# Patient Record
Sex: FEMALE | Race: ASIAN | NOT HISPANIC OR LATINO | Employment: UNEMPLOYED | URBAN - METROPOLITAN AREA
[De-identification: names, ages, dates, MRNs, and addresses within clinical notes are randomized per-mention and may not be internally consistent; named-entity substitution may affect disease eponyms.]

---

## 2022-10-15 ENCOUNTER — ANESTHESIA EVENT (OUTPATIENT)
Dept: OBSTETRICS AND GYNECOLOGY | Facility: HOSPITAL | Age: 37
End: 2022-10-15
Payer: COMMERCIAL

## 2022-10-15 ENCOUNTER — ANESTHESIA (OUTPATIENT)
Dept: OBSTETRICS AND GYNECOLOGY | Facility: HOSPITAL | Age: 37
End: 2022-10-15
Payer: COMMERCIAL

## 2022-10-15 ENCOUNTER — HOSPITAL ENCOUNTER (INPATIENT)
Facility: HOSPITAL | Age: 37
LOS: 3 days | Discharge: HOME | End: 2022-10-18
Attending: OBSTETRICS & GYNECOLOGY | Admitting: OBSTETRICS & GYNECOLOGY
Payer: COMMERCIAL

## 2022-10-15 PROBLEM — Z98.891 PREVIOUS CESAREAN SECTION: Status: ACTIVE | Noted: 2022-10-15

## 2022-10-15 LAB
BASOPHILS # BLD AUTO: 0.04 X10(3)/MCL (ref 0–0.2)
BASOPHILS NFR BLD AUTO: 0.5 %
EOSINOPHIL # BLD AUTO: 0.1 X10(3)/MCL (ref 0–0.9)
EOSINOPHIL NFR BLD AUTO: 1.2 %
ERYTHROCYTE [DISTWIDTH] IN BLOOD BY AUTOMATED COUNT: 14.7 % (ref 11.5–17)
GROUP & RH: NORMAL
HCT VFR BLD AUTO: 40.6 % (ref 37–47)
HGB BLD-MCNC: 13.4 GM/DL (ref 12–16)
IMM GRANULOCYTES # BLD AUTO: 0.32 X10(3)/MCL (ref 0–0.04)
IMM GRANULOCYTES NFR BLD AUTO: 3.7 %
INDIRECT COOMBS GEL: NORMAL
LYMPHOCYTES # BLD AUTO: 1.85 X10(3)/MCL (ref 0.6–4.6)
LYMPHOCYTES NFR BLD AUTO: 21.3 %
MCH RBC QN AUTO: 31.5 PG (ref 27–31)
MCHC RBC AUTO-ENTMCNC: 33 MG/DL (ref 33–36)
MCV RBC AUTO: 95.3 FL (ref 80–94)
MONOCYTES # BLD AUTO: 0.87 X10(3)/MCL (ref 0.1–1.3)
MONOCYTES NFR BLD AUTO: 10 %
NEUTROPHILS # BLD AUTO: 5.5 X10(3)/MCL (ref 2.1–9.2)
NEUTROPHILS NFR BLD AUTO: 63.3 %
NRBC BLD AUTO-RTO: 0 %
PLATELET # BLD AUTO: 226 X10(3)/MCL (ref 130–400)
PMV BLD AUTO: 10.5 FL (ref 7.4–10.4)
RBC # BLD AUTO: 4.26 X10(6)/MCL (ref 4.2–5.4)
T PALLIDUM AB SER QL: NONREACTIVE
WBC # SPEC AUTO: 8.7 X10(3)/MCL (ref 4.5–11.5)

## 2022-10-15 PROCEDURE — 63600175 PHARM REV CODE 636 W HCPCS: Performed by: OBSTETRICS & GYNECOLOGY

## 2022-10-15 PROCEDURE — 25000003 PHARM REV CODE 250: Performed by: ANESTHESIOLOGY

## 2022-10-15 PROCEDURE — 88305 TISSUE EXAM BY PATHOLOGIST: CPT | Performed by: OBSTETRICS & GYNECOLOGY

## 2022-10-15 PROCEDURE — 62322 NJX INTERLAMINAR LMBR/SAC: CPT | Performed by: REGISTERED NURSE

## 2022-10-15 PROCEDURE — 86780 TREPONEMA PALLIDUM: CPT | Performed by: OBSTETRICS & GYNECOLOGY

## 2022-10-15 PROCEDURE — 37000008 HC ANESTHESIA 1ST 15 MINUTES: Performed by: OBSTETRICS & GYNECOLOGY

## 2022-10-15 PROCEDURE — 86850 RBC ANTIBODY SCREEN: CPT | Performed by: OBSTETRICS & GYNECOLOGY

## 2022-10-15 PROCEDURE — 85025 COMPLETE CBC W/AUTO DIFF WBC: CPT | Performed by: OBSTETRICS & GYNECOLOGY

## 2022-10-15 PROCEDURE — 51702 INSERT TEMP BLADDER CATH: CPT

## 2022-10-15 PROCEDURE — 63600175 PHARM REV CODE 636 W HCPCS: Performed by: REGISTERED NURSE

## 2022-10-15 PROCEDURE — 36415 COLL VENOUS BLD VENIPUNCTURE: CPT | Performed by: OBSTETRICS & GYNECOLOGY

## 2022-10-15 PROCEDURE — 36004724 HC OB OR TIME LEV III - 1ST 15 MIN: Performed by: OBSTETRICS & GYNECOLOGY

## 2022-10-15 PROCEDURE — 71000033 HC RECOVERY, INTIAL HOUR: Performed by: OBSTETRICS & GYNECOLOGY

## 2022-10-15 PROCEDURE — 25000003 PHARM REV CODE 250: Performed by: OBSTETRICS & GYNECOLOGY

## 2022-10-15 PROCEDURE — 37000009 HC ANESTHESIA EA ADD 15 MINS: Performed by: OBSTETRICS & GYNECOLOGY

## 2022-10-15 PROCEDURE — 36004725 HC OB OR TIME LEV III - EA ADD 15 MIN: Performed by: OBSTETRICS & GYNECOLOGY

## 2022-10-15 PROCEDURE — 11000001 HC ACUTE MED/SURG PRIVATE ROOM

## 2022-10-15 PROCEDURE — 27201423 OPTIME MED/SURG SUP & DEVICES STERILE SUPPLY: Performed by: OBSTETRICS & GYNECOLOGY

## 2022-10-15 PROCEDURE — 27000492 HC SLEEVE, SCD T/L

## 2022-10-15 RX ORDER — SODIUM CITRATE AND CITRIC ACID MONOHYDRATE 334; 500 MG/5ML; MG/5ML
30 SOLUTION ORAL ONCE
Status: CANCELLED | OUTPATIENT
Start: 2022-10-15 | End: 2022-10-15

## 2022-10-15 RX ORDER — ADHESIVE BANDAGE
30 BANDAGE TOPICAL 2 TIMES DAILY PRN
Status: DISCONTINUED | OUTPATIENT
Start: 2022-10-16 | End: 2022-10-18 | Stop reason: HOSPADM

## 2022-10-15 RX ORDER — CEFAZOLIN SODIUM 1 G/3ML
INJECTION, POWDER, FOR SOLUTION INTRAMUSCULAR; INTRAVENOUS
Status: DISCONTINUED | OUTPATIENT
Start: 2022-10-15 | End: 2022-10-15

## 2022-10-15 RX ORDER — NALBUPHINE HYDROCHLORIDE 10 MG/ML
2.5 INJECTION, SOLUTION INTRAMUSCULAR; INTRAVENOUS; SUBCUTANEOUS ONCE
Status: CANCELLED | OUTPATIENT
Start: 2022-10-15 | End: 2022-10-15

## 2022-10-15 RX ORDER — MORPHINE SULFATE 1 MG/ML
INJECTION, SOLUTION EPIDURAL; INTRATHECAL; INTRAVENOUS
Status: DISCONTINUED | OUTPATIENT
Start: 2022-10-15 | End: 2022-10-15

## 2022-10-15 RX ORDER — KETOROLAC TROMETHAMINE 30 MG/ML
30 INJECTION, SOLUTION INTRAMUSCULAR; INTRAVENOUS EVERY 8 HOURS
Status: COMPLETED | OUTPATIENT
Start: 2022-10-15 | End: 2022-10-15

## 2022-10-15 RX ORDER — PRENATAL WITH FERROUS FUM AND FOLIC ACID 3080; 920; 120; 400; 22; 1.84; 3; 20; 10; 1; 12; 200; 27; 25; 2 [IU]/1; [IU]/1; MG/1; [IU]/1; MG/1; MG/1; MG/1; MG/1; MG/1; MG/1; UG/1; MG/1; MG/1; MG/1; MG/1
1 TABLET ORAL DAILY
Status: DISCONTINUED | OUTPATIENT
Start: 2022-10-15 | End: 2022-10-18 | Stop reason: HOSPADM

## 2022-10-15 RX ORDER — AMOXICILLIN 250 MG
1 CAPSULE ORAL NIGHTLY PRN
Status: DISCONTINUED | OUTPATIENT
Start: 2022-10-15 | End: 2022-10-18 | Stop reason: HOSPADM

## 2022-10-15 RX ORDER — SODIUM CHLORIDE, SODIUM LACTATE, POTASSIUM CHLORIDE, CALCIUM CHLORIDE 600; 310; 30; 20 MG/100ML; MG/100ML; MG/100ML; MG/100ML
INJECTION, SOLUTION INTRAVENOUS CONTINUOUS
Status: DISCONTINUED | OUTPATIENT
Start: 2022-10-15 | End: 2022-10-15

## 2022-10-15 RX ORDER — PROCHLORPERAZINE EDISYLATE 5 MG/ML
5 INJECTION INTRAMUSCULAR; INTRAVENOUS EVERY 6 HOURS PRN
Status: DISCONTINUED | OUTPATIENT
Start: 2022-10-15 | End: 2022-10-18 | Stop reason: HOSPADM

## 2022-10-15 RX ORDER — EPHEDRINE SULFATE 50 MG/ML
10 INJECTION, SOLUTION INTRAVENOUS ONCE AS NEEDED
Status: CANCELLED | OUTPATIENT
Start: 2022-10-15 | End: 2034-03-12

## 2022-10-15 RX ORDER — OXYCODONE AND ACETAMINOPHEN 5; 325 MG/1; MG/1
1 TABLET ORAL EVERY 4 HOURS PRN
Status: DISCONTINUED | OUTPATIENT
Start: 2022-10-15 | End: 2022-10-18 | Stop reason: HOSPADM

## 2022-10-15 RX ORDER — BUPIVACAINE HYDROCHLORIDE 7.5 MG/ML
INJECTION, SOLUTION EPIDURAL; RETROBULBAR
Status: COMPLETED | OUTPATIENT
Start: 2022-10-15 | End: 2022-10-15

## 2022-10-15 RX ORDER — DOCUSATE SODIUM 100 MG/1
200 CAPSULE, LIQUID FILLED ORAL 2 TIMES DAILY
Status: DISCONTINUED | OUTPATIENT
Start: 2022-10-15 | End: 2022-10-18 | Stop reason: HOSPADM

## 2022-10-15 RX ORDER — PHENYLEPHRINE HYDROCHLORIDE 10 MG/ML
INJECTION INTRAVENOUS
Status: DISCONTINUED | OUTPATIENT
Start: 2022-10-15 | End: 2022-10-15

## 2022-10-15 RX ORDER — SODIUM CHLORIDE 0.9 % (FLUSH) 0.9 %
10 SYRINGE (ML) INJECTION
Status: DISCONTINUED | OUTPATIENT
Start: 2022-10-15 | End: 2022-10-18 | Stop reason: HOSPADM

## 2022-10-15 RX ORDER — OXYCODONE AND ACETAMINOPHEN 10; 325 MG/1; MG/1
1 TABLET ORAL EVERY 4 HOURS PRN
Status: DISCONTINUED | OUTPATIENT
Start: 2022-10-15 | End: 2022-10-18 | Stop reason: HOSPADM

## 2022-10-15 RX ORDER — CEFAZOLIN SODIUM 2 G/50ML
2 SOLUTION INTRAVENOUS ONCE
Status: DISCONTINUED | OUTPATIENT
Start: 2022-10-15 | End: 2022-10-15

## 2022-10-15 RX ORDER — DIPHENHYDRAMINE HCL 25 MG
25 CAPSULE ORAL EVERY 4 HOURS PRN
Status: DISCONTINUED | OUTPATIENT
Start: 2022-10-15 | End: 2022-10-18 | Stop reason: HOSPADM

## 2022-10-15 RX ORDER — DIPHENHYDRAMINE HYDROCHLORIDE 50 MG/ML
12.5 INJECTION INTRAMUSCULAR; INTRAVENOUS EVERY 4 HOURS PRN
Status: CANCELLED | OUTPATIENT
Start: 2022-10-15

## 2022-10-15 RX ORDER — ONDANSETRON 4 MG/1
8 TABLET, ORALLY DISINTEGRATING ORAL EVERY 8 HOURS PRN
Status: DISCONTINUED | OUTPATIENT
Start: 2022-10-15 | End: 2022-10-18 | Stop reason: HOSPADM

## 2022-10-15 RX ORDER — ACETAMINOPHEN 10 MG/ML
INJECTION, SOLUTION INTRAVENOUS
Status: DISCONTINUED | OUTPATIENT
Start: 2022-10-15 | End: 2022-10-15

## 2022-10-15 RX ORDER — SIMETHICONE 80 MG
1 TABLET,CHEWABLE ORAL EVERY 6 HOURS PRN
Status: DISCONTINUED | OUTPATIENT
Start: 2022-10-15 | End: 2022-10-18 | Stop reason: HOSPADM

## 2022-10-15 RX ORDER — ONDANSETRON HYDROCHLORIDE 2 MG/ML
INJECTION, SOLUTION INTRAMUSCULAR; INTRAVENOUS
Status: DISCONTINUED | OUTPATIENT
Start: 2022-10-15 | End: 2022-10-15

## 2022-10-15 RX ORDER — OXYTOCIN/RINGER'S LACTATE 30/500 ML
95 PLASTIC BAG, INJECTION (ML) INTRAVENOUS ONCE
Status: COMPLETED | OUTPATIENT
Start: 2022-10-15 | End: 2022-10-15

## 2022-10-15 RX ORDER — BISACODYL 10 MG
10 SUPPOSITORY, RECTAL RECTAL ONCE AS NEEDED
Status: DISCONTINUED | OUTPATIENT
Start: 2022-10-15 | End: 2022-10-18 | Stop reason: HOSPADM

## 2022-10-15 RX ORDER — OXYTOCIN/RINGER'S LACTATE 30/500 ML
PLASTIC BAG, INJECTION (ML) INTRAVENOUS CONTINUOUS PRN
Status: DISCONTINUED | OUTPATIENT
Start: 2022-10-15 | End: 2022-10-15

## 2022-10-15 RX ORDER — FENTANYL CITRATE 50 UG/ML
INJECTION, SOLUTION INTRAMUSCULAR; INTRAVENOUS
Status: DISCONTINUED | OUTPATIENT
Start: 2022-10-15 | End: 2022-10-15

## 2022-10-15 RX ORDER — HYDROMORPHONE HYDROCHLORIDE 2 MG/ML
1 INJECTION, SOLUTION INTRAMUSCULAR; INTRAVENOUS; SUBCUTANEOUS EVERY 4 HOURS PRN
Status: DISCONTINUED | OUTPATIENT
Start: 2022-10-15 | End: 2022-10-18 | Stop reason: HOSPADM

## 2022-10-15 RX ORDER — SODIUM CITRATE AND CITRIC ACID MONOHYDRATE 334; 500 MG/5ML; MG/5ML
SOLUTION ORAL
Status: DISPENSED
Start: 2022-10-15 | End: 2022-10-15

## 2022-10-15 RX ADMIN — BUPIVACAINE HYDROCHLORIDE 1.6 ML: 7.5 INJECTION, SOLUTION EPIDURAL; RETROBULBAR at 04:10

## 2022-10-15 RX ADMIN — FENTANYL CITRATE 10 MCG: 50 INJECTION, SOLUTION INTRAMUSCULAR; INTRAVENOUS at 04:10

## 2022-10-15 RX ADMIN — KETOROLAC TROMETHAMINE 30 MG: 30 INJECTION, SOLUTION INTRAMUSCULAR at 08:10

## 2022-10-15 RX ADMIN — SODIUM CHLORIDE, POTASSIUM CHLORIDE, SODIUM LACTATE AND CALCIUM CHLORIDE: 600; 310; 30; 20 INJECTION, SOLUTION INTRAVENOUS at 04:10

## 2022-10-15 RX ADMIN — KETOROLAC TROMETHAMINE 30 MG: 30 INJECTION, SOLUTION INTRAMUSCULAR at 01:10

## 2022-10-15 RX ADMIN — MORPHINE SULFATE 0.15 MG: 1 INJECTION, SOLUTION EPIDURAL; INTRATHECAL; INTRAVENOUS at 04:10

## 2022-10-15 RX ADMIN — ACETAMINOPHEN 1000 MG: 10 INJECTION, SOLUTION INTRAVENOUS at 04:10

## 2022-10-15 RX ADMIN — Medication 1000 ML/HR: at 04:10

## 2022-10-15 RX ADMIN — Medication 95 MILLI-UNITS/MIN: at 08:10

## 2022-10-15 RX ADMIN — ONDANSETRON 4 MG: 2 INJECTION INTRAMUSCULAR; INTRAVENOUS at 04:10

## 2022-10-15 RX ADMIN — OXYCODONE AND ACETAMINOPHEN 1 TABLET: 325; 10 TABLET ORAL at 05:10

## 2022-10-15 RX ADMIN — PHENYLEPHRINE HYDROCHLORIDE 200 MCG: 10 INJECTION INTRAVENOUS at 04:10

## 2022-10-15 RX ADMIN — CEFAZOLIN 2 G: 330 INJECTION, POWDER, FOR SOLUTION INTRAMUSCULAR; INTRAVENOUS at 04:10

## 2022-10-15 RX ADMIN — DIPHENHYDRAMINE HYDROCHLORIDE 25 MG: 25 CAPSULE ORAL at 09:10

## 2022-10-15 RX ADMIN — DOCUSATE SODIUM 200 MG: 100 CAPSULE, LIQUID FILLED ORAL at 09:10

## 2022-10-15 RX ADMIN — KETOROLAC TROMETHAMINE 30 MG: 30 INJECTION, SOLUTION INTRAMUSCULAR at 09:10

## 2022-10-15 NOTE — TRANSFER OF CARE
Anesthesia Transfer of Care Note    Patient: Mack Lopez    Procedure(s) Performed: Procedure(s) (LRB):   SECTION (N/A)    Patient location: Labor and Delivery    Anesthesia Type: spinal    Transport from OR: Transported from OR on room air with adequate spontaneous ventilation    Post pain: adequate analgesia    Post assessment: no apparent anesthetic complications and tolerated procedure well    Post vital signs: stable    Level of consciousness: awake, alert and oriented    Nausea/Vomiting: no nausea/vomiting    Complications: none    Transfer of care protocol was followed      Last vitals:   Visit Vitals  /82 (BP Location: Left arm, Patient Position: Lying)   Pulse 80   Breastfeeding No

## 2022-10-15 NOTE — ANESTHESIA PROCEDURE NOTES
Spinal    Diagnosis:  section  Patient location during procedure: OB  Start time: 10/15/2022 4:10 AM  Timeout: 10/15/2022 4:10 AM  End time: 10/15/2022 4:20 AM    Staffing  Authorizing Provider: Xavi Peters MD  Performing Provider: Gibran Huddleston CRNA    Preanesthetic Checklist  Completed: patient identified, IV checked, site marked, risks and benefits discussed, surgical consent, monitors and equipment checked, pre-op evaluation and timeout performed  Spinal Block  Patient position: sitting  Prep: ChloraPrep  Patient monitoring: heart rate, cardiac monitor, continuous pulse ox and frequent blood pressure checks  Approach: midline  Location: L3-4  Injection technique: single shot  CSF Fluid: clear free-flowing CSF  Needle  Needle type: pencil-tip   Needle gauge: 25 G  Needle length: 5 in  Additional Documentation: negative aspiration for heme and incremental injection  Needle localization: anatomical landmarks  Assessment  Sensory level: T4   Dermatomal levels determined by alcohol wipe  Ease of block: easy  Patient's tolerance of the procedure: comfortable throughout block and no complaints  Medications:    Medications: bupivacaine (pf) (MARCAINE) injection 0.75% - Intraspinal   1.6 mL - 10/15/2022 4:20:00 AM         Dermal Autograft Text: The defect edges were debeveled with a #15 scalpel blade.  Given the location of the defect, shape of the defect and the proximity to free margins a dermal autograft was deemed most appropriate.  Using a sterile surgical marker, the primary defect shape was transferred to the donor site. The area thus outlined was incised deep to adipose tissue with a #15 scalpel blade.  The harvested graft was then trimmed of adipose and epidermal tissue until only dermis was left.  The skin graft was then placed in the primary defect and oriented appropriately.

## 2022-10-15 NOTE — ANESTHESIA PREPROCEDURE EVALUATION
10/15/2022  Mack Lopez is a 37 y.o., female.      Pre-op Assessment    I have reviewed the Patient Summary Reports.     I have reviewed the Nursing Notes. I have reviewed the NPO Status.   I have reviewed the Medications.     Review of Systems  Anesthesia Hx:  No problems with previous Anesthesia    Hematology/Oncology:  Hematology Normal   Oncology Normal     EENT/Dental:EENT/Dental Normal   Cardiovascular:  Cardiovascular Normal     Pulmonary:  Pulmonary Normal    Renal/:  Renal/ Normal     Hepatic/GI:  Hepatic/GI Normal    Musculoskeletal:  Musculoskeletal Normal    Neurological:  Neurology Normal    Endocrine:  Endocrine Normal    Dermatological:  Skin Normal    Psych:  Psychiatric Normal           Physical Exam  General: Well nourished, Cooperative, Alert, Oriented and Anxious    Airway:  Mallampati: II   Mouth Opening: Normal  TM Distance: Normal  Tongue: Normal  Neck ROM: Normal ROM    Dental:  Intact    Chest/Lungs:  Clear to auscultation, Normal Respiratory Rate    Heart:  Rate: Normal  Rhythm: Regular Rhythm        Anesthesia Plan  Type of Anesthesia, risks & benefits discussed:    Anesthesia Type: Spinal  Intra-op Monitoring Plan: Standard ASA Monitors  Post Op Pain Control Plan: multimodal analgesia  Informed Consent: Informed consent signed with the Patient and all parties understand the risks and agree with anesthesia plan.  All questions answered.   ASA Score: 1  Day of Surgery Review of History & Physical: H&P Update referred to the surgeon/provider.    Ready For Surgery From Anesthesia Perspective.     .

## 2022-10-15 NOTE — L&D DELIVERY NOTE
Pre-op Diagnosis:   1.  Intrauterine Pregnancy at 38 WGA  2.  Previous c/s x 1  3. labor  4. Declines TOLAC  5. Undesired fertility    Postop Diagnosis: Same  Procedure: Repeat Low Transverse  Section via Pfannenstiel incision with bilateral tubal ligation  Surgeon: Brittany Alonso MD  Anesthesia: Spinal  Complications: None  QBL: per RN  Findings: Normal uterus, tubes and ovaries.  Viable male infant with Apgars of 8,9 weighing 7lbs  Specimen: Placenta, left and right tube segments    Indication and Consent: Patient presented to L&D scheduled repeat  delivery. I discussed risks, benefits and options with her in detail, including trial of labor.  She desired to proceed with a repeat  delivery. The patient understood that the risks of  section include, but are not limited to, visceral or vascular injury, infection, blood loss and the need for transfusion, prolonged hospitalization and reoperation.  The patient stated understanding and desired to proceed.  All questions were answered.     Procedure: She was taken to the operating room where epidural anesthesia was found to be adequate.  Ancef was given for infection prophylaxis.  She was prepped and draped in the dorsal supine position with a leftward tilt.  A Pfannenstiel skin incision was made with the scalpel and carried down to the fascia with the Bovie.  The fascia was incised and extended laterally with the Bovie.  The superior aspect of the fascia was grasped with the Kocher clamps.  The underlying rectus muscle was dissected off sharply with Green scissors.  In a similar fashion, the inferior aspect of the fascia was elevated with the Kocher clamps and the rectus and pyramidalis muscles were dissected off.  Hemostasis was achieved with the Bovie.  The rectus muscle was  in the midline down to the level of the pubic symphysis.  Preperitoneal fatty tissue was bluntly dissected to expose the peritoneum.  The peritoneum was  found to be free of adherent bowel and entered sharply with scissors.  The peritoneal incision was extended superiorly and inferiorly to the bladder reflection with good visualization of the bladder.  The Az retractor was placed. The vesicouterine peritoneum identified, then opened with scissors and the bladder flap was developed.   The lower uterine segment was incised with a scalpel.  The amniotic sac was ruptured and clear was noted.  The uterine incision was extended bluntly with lateral and upward traction.  The fetus was in cephalic presentation.  The head was gently elevated out of the pelvis and gentle fundal pressure was applied once the head was brought to the incision.  The infant was delivered without difficulty.  The mouth and nose were suctioned with bulb suction.  The cord was clamped and cut.  The infant was handed off to waiting NICU and respiratory personnel.  IV Pitocin was initiated to facilitate uterine contractions.  The placenta was delivered intact with manual massage of the uterine fundus.  The inside of the uterus was gently wiped with a lap sponge to assure complete removal of placental membranes.  The uterine incision was closed with a 0 Vicryl suture in a running locked fashion.  Blood clots and fluid were wiped out of the abdomen and pelvis with moist lap sponges.  The uterine incision was reinspected and good hemostasis was noted. Left tube grasped with tyson clamp, window made in mesosalpinx and segment excised.  Either pedicle was double ligated with 2-0 plain gut suture.  Same steps repeated on opposite side.  Pedicles reinspected and noted to be hemostatic.The Az retractor was removed.   The peritoneum was closed in a running fashion. The rectus muscles were loosely reapproximated.  The fascia was closed with 1-0 Vicryl in a continuous running fashion.  The subcuticular tissue was irrigated with warm normal saline.  Subcuticular tissue was reapproximated with plain gut.    Skin was closed using Monocryl in a subcuticular fashion.  The patient tolerated the procedure well.  All sponge and lap counts were correct times 2.  The patient was taken to the recovery room in stable condition.

## 2022-10-15 NOTE — PLAN OF CARE
Problem: Adult Inpatient Plan of Care  Goal: Plan of Care Review  Outcome: Ongoing, Progressing  Flowsheets (Taken 10/15/2022 0614)  Plan of Care Reviewed With:   patient   spouse  Goal: Patient-Specific Goal (Individualized)  Outcome: Ongoing, Progressing  Goal: Absence of Hospital-Acquired Illness or Injury  Outcome: Ongoing, Progressing  Intervention: Identify and Manage Fall Risk  Flowsheets (Taken 10/15/2022 0614)  Safety Promotion/Fall Prevention:   assistive device/personal item within reach   side rails raised x 2   pulse ox   lighting adjusted   instructed to call staff for mobility  Intervention: Prevent and Manage VTE (Venous Thromboembolism) Risk  Flowsheets (Taken 10/15/2022 0614)  VTE Prevention/Management:   remove, assess skin, and reapply sequential compression device   intravenous hydration   ROM (active) performed  Goal: Optimal Comfort and Wellbeing  Outcome: Ongoing, Progressing  Intervention: Monitor Pain and Promote Comfort  Flowsheets (Taken 10/15/2022 0614)  Pain Management Interventions:   warm blanket provided   quiet environment facilitated  Goal: Readiness for Transition of Care  Outcome: Ongoing, Progressing  Intervention: Mutually Develop Transition Plan  Flowsheets (Taken 10/15/2022 0614)  Equipment Currently Used at Home: none  Transportation Anticipated: family or friend will provide  Do you expect to return to your current living situation?: Yes  Do you have help at home or someone to help you manage your care at home?: Yes     Problem:  Fall Injury Risk  Goal: Absence of Fall, Infant Drop and Related Injury  Outcome: Ongoing, Progressing  Intervention: Identify and Manage Contributors  Flowsheets (Taken 10/15/2022 0614)  Self-Care Promotion: independence encouraged  Intervention: Prevent [place] Drop or Fall  Flowsheets (Taken 10/15/2022 0614)  Infant Drop Prevention:   safety rounds completed   room lighting adjusted   support person presence encouraged  Intervention:  Promote Injury-Free Environment  Flowsheets (Taken 10/15/2022 0614)  Safety Promotion/Fall Prevention:   assistive device/personal item within reach   side rails raised x 2   pulse ox   lighting adjusted   instructed to call staff for mobility     Problem: Infection  Goal: Absence of Infection Signs and Symptoms  Outcome: Ongoing, Progressing  Intervention: Prevent or Manage Infection  Flowsheets (Taken 10/15/2022 0614)  Infection Management: aseptic technique maintained

## 2022-10-15 NOTE — ANESTHESIA POSTPROCEDURE EVALUATION
Anesthesia Post Evaluation    Patient: Mack Lopez    Procedure(s) Performed: Procedure(s) (LRB):   SECTION (N/A)    Final Anesthesia Type: spinal      Patient location during evaluation: labor & delivery  Patient participation: Yes- Able to Participate  Level of consciousness: awake and alert  Post-procedure vital signs: reviewed and stable  Pain management: adequate  Airway patency: patent  SUSAN mitigation strategies: Multimodal analgesia  PONV status at discharge: No PONV  Anesthetic complications: no      Cardiovascular status: blood pressure returned to baseline and hemodynamically stable  Respiratory status: unassisted  Hydration status: euvolemic  Follow-up not needed.          Vitals Value Taken Time   /82 10/15/22 0325   Temp  10/15/22 0523   Pulse 80 10/15/22 0325   Resp  10/15/22 0523   SpO2  10/15/22 0523         No case tracking events are documented in the log.      Pain/Lori Score: No data recorded

## 2022-10-15 NOTE — H&P
HISTORY AND PHYSICAL                                                OBSTETRICS          Subjective:      Mack Lopez is a 37 y.o.  female with IUP at 38w1d weeks gestation who presents to L&D with leaking fluid. Pertinent medical history for this pregnancy includes previous c/s x1 and undesired fertility.  Care this pregnancy has been with Dr. Clifton    PMHx: No past medical history on file.    PSHx: History reviewed. No pertinent surgical history.    All: Review of patient's allergies indicates:  No Known Allergies    Meds:   Medications Prior to Admission   Medication Sig Dispense Refill Last Dose    prenatal vit/iron fum/folic ac (PRENATAL 1+1 ORAL) Take by mouth.          SH:   Social History     Socioeconomic History    Marital status: Single   Tobacco Use    Smoking status: Never    Smokeless tobacco: Never   Substance and Sexual Activity    Alcohol use: Not Currently    Drug use: Never    Sexual activity: Yes       FH:   Family History   Problem Relation Age of Onset    Diabetes Father        OBHx:   OB History    Para Term  AB Living   3 1 1 0 1 0   SAB IAB Ectopic Multiple Live Births   0 0 0 0 0      # Outcome Date GA Lbr Gage/2nd Weight Sex Delivery Anes PTL Lv   3 Current            2 Term 17 39w0d    CS-LTranv      1 AB 2016 6w0d       FD       Objective:      /82 (BP Location: Left arm, Patient Position: Lying)   Pulse 80   Breastfeeding No   There is no height or weight on file to calculate BMI.    General:   alert and cooperative   HEENT:  normocephalic, atraumatic   Lungs:   clear to auscultation bilaterally   Heart:   regular rate and rhythm, S1, S2 normal   Abdomen:  gravid, non-tender   Extremities non-tender, no edema   Derm: no rashes or lesions   Psych: appropriate mood and affect   FHT: Reassuring per nursing staff       Assessment:     37 y.o.  at 38w1d weeks gestation here for repeat  delivery  2. H/o  delivery x 1  3. PROM  4.  Undesired fertility     Plan:        1. Risks, benefits, alternatives and possible complications have been discussed in detail with the patient. All questions have been answered, and Ms. Lopez has voiced understanding and agrees to the treatment plan.  2. Consents signed and in chart  3. Admit to Labor and Delivery unit for repeat  delivery  4. Antibiotics per protocol and SCDs for prophylaxis  5. To OR for RLTCS/BTL - r/b/a of permanent sterilization reviewed and patient desires to proceed.

## 2022-10-16 PROCEDURE — 25000003 PHARM REV CODE 250: Performed by: OBSTETRICS & GYNECOLOGY

## 2022-10-16 PROCEDURE — 11000001 HC ACUTE MED/SURG PRIVATE ROOM

## 2022-10-16 RX ADMIN — DOCUSATE SODIUM 200 MG: 100 CAPSULE, LIQUID FILLED ORAL at 09:10

## 2022-10-16 RX ADMIN — IBUPROFEN 800 MG: 200 TABLET, FILM COATED ORAL at 05:10

## 2022-10-16 RX ADMIN — OXYCODONE HYDROCHLORIDE AND ACETAMINOPHEN 1 TABLET: 5; 325 TABLET ORAL at 03:10

## 2022-10-16 RX ADMIN — OXYCODONE AND ACETAMINOPHEN 1 TABLET: 325; 10 TABLET ORAL at 03:10

## 2022-10-16 RX ADMIN — OXYCODONE AND ACETAMINOPHEN 1 TABLET: 325; 10 TABLET ORAL at 09:10

## 2022-10-16 RX ADMIN — IBUPROFEN 800 MG: 200 TABLET, FILM COATED ORAL at 02:10

## 2022-10-16 RX ADMIN — OXYCODONE AND ACETAMINOPHEN 1 TABLET: 325; 10 TABLET ORAL at 08:10

## 2022-10-16 RX ADMIN — PRENATAL VITAMINS-IRON FUMARATE 27 MG IRON-FOLIC ACID 0.8 MG TABLET 1 TABLET: at 09:10

## 2022-10-16 NOTE — PLAN OF CARE
"" I want to get some sleep and rest"  Problem: Adult Inpatient Plan of Care  Goal: Plan of Care Review  Outcome: Ongoing, Progressing  Goal: Patient-Specific Goal (Individualized)  Outcome: Ongoing, Progressing  Goal: Absence of Hospital-Acquired Illness or Injury  Outcome: Ongoing, Progressing  Goal: Optimal Comfort and Wellbeing  Outcome: Ongoing, Progressing  Goal: Readiness for Transition of Care  Outcome: Ongoing, Progressing     "

## 2022-10-16 NOTE — PROGRESS NOTES
PostPartum Progress Note        Subjective:      Post-Operative Day #1 after  delivery secondary to previous c/s x 1 in labor, with BTL .    Patient is without complaints. Lochia decreasing. Breast feeding. Pain is well controlled. Patient is ambulating. Tolerating Full Regular diet.Overall mother and baby are doing well.     Objective:      Temp:  [97.9 °F (36.6 °C)-98.7 °F (37.1 °C)] 98.7 °F (37.1 °C)  Pulse:  [74-93] 93  Resp:  [16-18] 16  BP: ()/(52-73) 103/64    Intake/Output Summary (Last 24 hours) at 10/16/2022 0915  Last data filed at 10/15/2022 1500  Gross per 24 hour   Intake --   Output 1200 ml   Net -1200 ml     Body mass index is 28.9 kg/m².    General: no acute distress  Abdomen: soft, non-tender, non-distended; Fundus firm and below the umbilicus  Incision- intact, healing well, no sign of infection  Extremities: non-tender, symmetric    Group & Rh   Date Value Ref Range Status   10/15/2022 B POS  Final     Recent Results (from the past 336 hour(s))   CBC with Differential    Collection Time: 10/15/22  3:39 AM   Result Value Ref Range    WBC 8.7 4.5 - 11.5 x10(3)/mcL    Hgb 13.4 12.0 - 16.0 gm/dL    Hct 40.6 37.0 - 47.0 %    Platelet 226 130 - 400 x10(3)/mcL          Assessment:     37 y.o.  S/P  Delivery Post-Operative Day #1  - Doing Well      Plan:     1. Continue routine postpartum care  2. Plan for D/C in AM

## 2022-10-17 ENCOUNTER — ANESTHESIA EVENT (OUTPATIENT)
Dept: OBSTETRICS AND GYNECOLOGY | Facility: HOSPITAL | Age: 37
End: 2022-10-17

## 2022-10-17 ENCOUNTER — ANESTHESIA (OUTPATIENT)
Dept: OBSTETRICS AND GYNECOLOGY | Facility: HOSPITAL | Age: 37
End: 2022-10-17

## 2022-10-17 PROCEDURE — 25000003 PHARM REV CODE 250: Performed by: OBSTETRICS & GYNECOLOGY

## 2022-10-17 PROCEDURE — 11000001 HC ACUTE MED/SURG PRIVATE ROOM

## 2022-10-17 RX ADMIN — OXYCODONE AND ACETAMINOPHEN 1 TABLET: 325; 10 TABLET ORAL at 02:10

## 2022-10-17 RX ADMIN — DOCUSATE SODIUM 200 MG: 100 CAPSULE, LIQUID FILLED ORAL at 07:10

## 2022-10-17 RX ADMIN — PRENATAL VITAMINS-IRON FUMARATE 27 MG IRON-FOLIC ACID 0.8 MG TABLET 1 TABLET: at 07:10

## 2022-10-17 RX ADMIN — OXYCODONE AND ACETAMINOPHEN 1 TABLET: 325; 10 TABLET ORAL at 08:10

## 2022-10-17 RX ADMIN — OXYCODONE AND ACETAMINOPHEN 1 TABLET: 325; 10 TABLET ORAL at 07:10

## 2022-10-17 RX ADMIN — IBUPROFEN 800 MG: 200 TABLET, FILM COATED ORAL at 09:10

## 2022-10-17 RX ADMIN — IBUPROFEN 800 MG: 200 TABLET, FILM COATED ORAL at 05:10

## 2022-10-17 RX ADMIN — IBUPROFEN 800 MG: 200 TABLET, FILM COATED ORAL at 12:10

## 2022-10-17 RX ADMIN — DOCUSATE SODIUM 200 MG: 100 CAPSULE, LIQUID FILLED ORAL at 08:10

## 2022-10-17 NOTE — PLAN OF CARE
"  Problem: Adult Inpatient Plan of Care  Goal: Plan of Care Review  Outcome: Ongoing, Progressing  Goal: Patient-Specific Goal (Individualized)  Outcome: Ongoing, Progressing  Flowsheets (Taken 10/16/2022 1926)  Patient-Specific Goals (Include Timeframe): " I want to breastfeed"  Goal: Absence of Hospital-Acquired Illness or Injury  Outcome: Ongoing, Progressing  Goal: Optimal Comfort and Wellbeing  Outcome: Ongoing, Progressing  Goal: Readiness for Transition of Care  Outcome: Ongoing, Progressing     Problem:  Fall Injury Risk  Goal: Absence of Fall, Infant Drop and Related Injury  Outcome: Ongoing, Progressing     Problem: Infection  Goal: Absence of Infection Signs and Symptoms  Outcome: Ongoing, Progressing     "

## 2022-10-17 NOTE — PROGRESS NOTES
Delivery Progress Note  Obstetrics      SUBJECTIVE:     Postpartum Day 2:  Delivery    Ms. Lopez states she feels well. Her pain is well controlled with current medications. The baby is feeding via breast. The patient is ambulating well. Ms. Lopez is tolerating a normal diet. Flatus has been passed. Urinary output is adequate.    OBJECTIVE:     Vital Signs (Most Recent):  Temp: 98 °F (36.7 °C) (10/17/22 0023)  Pulse: 76 (10/17/22 0023)  Resp: 18 (10/17/22 0745)  BP: (!) 108/59 (10/17/22 0023)  SpO2: 100 % (10/15/22 0620)    Vital Signs Range (Last 24H):  Temp:  [98 °F (36.7 °C)-98.8 °F (37.1 °C)]   Pulse:  [76-93]   Resp:  [12-18]   BP: (103-110)/(59-69)     I & O (Last 24H):No intake or output data in the 24 hours ending 10/17/22 0834  Physical Exam:  General:    No distress, alert and oriented   Breasts:  No masses and nontender   Abdomen:  Soft, normal bowel sounds, appropriately tender   Fundus:  Firm, below umbilicus   Incision:  Intact, no erythema or drainage   Lochia:   Rubra, minimal   Lower ext:  No calf tenderness     Hemoglobin/Hematocrit  Recent Labs   Lab 10/15/22  0339   HGB 13.4   HCT 40.6         ASSESSMENT/PLAN:     Status post  section POD#2    Continue routine postpartum care  Encourage ambulation  DC in AM

## 2022-10-17 NOTE — PLAN OF CARE
Problem: Breastfeeding  Goal: Effective Breastfeeding  Outcome: Ongoing, Progressing  Intervention: Promote Breast Care and Comfort  Flowsheets (Taken 10/17/2022 1345)  Breast Care: Breastfeeding:   lanolin to nipples   Hydrogel dressing applied  Intervention: Promote Effective Breastfeeding  Flowsheets (Taken 10/17/2022 1345)  Breastfeeding Assistance:   feeding cue recognition promoted   feeding on demand promoted   support offered   infant latch-on verified  Parent/Child Attachment Promotion:   cue recognition promoted   skin-to-skin contact encouraged  Intervention: Support Exclusive Breastfeeding Success  Flowsheets (Taken 10/17/2022 1345)  Breastfeeding Support: lactation counseling provided

## 2022-10-18 VITALS
SYSTOLIC BLOOD PRESSURE: 108 MMHG | HEIGHT: 60 IN | WEIGHT: 148 LBS | DIASTOLIC BLOOD PRESSURE: 70 MMHG | RESPIRATION RATE: 18 BRPM | TEMPERATURE: 99 F | BODY MASS INDEX: 29.06 KG/M2 | OXYGEN SATURATION: 100 % | HEART RATE: 78 BPM

## 2022-10-18 PROCEDURE — 25000003 PHARM REV CODE 250: Performed by: OBSTETRICS & GYNECOLOGY

## 2022-10-18 RX ORDER — IBUPROFEN 800 MG/1
800 TABLET ORAL EVERY 8 HOURS
Qty: 30 TABLET | Refills: 0 | Status: SHIPPED | OUTPATIENT
Start: 2022-10-18

## 2022-10-18 RX ORDER — OXYCODONE AND ACETAMINOPHEN 10; 325 MG/1; MG/1
1 TABLET ORAL EVERY 4 HOURS PRN
Qty: 20 TABLET | Refills: 0 | Status: SHIPPED | OUTPATIENT
Start: 2022-10-18

## 2022-10-18 RX ADMIN — IBUPROFEN 800 MG: 200 TABLET, FILM COATED ORAL at 02:10

## 2022-10-18 RX ADMIN — OXYCODONE AND ACETAMINOPHEN 1 TABLET: 325; 10 TABLET ORAL at 10:10

## 2022-10-18 RX ADMIN — PRENATAL VITAMINS-IRON FUMARATE 27 MG IRON-FOLIC ACID 0.8 MG TABLET 1 TABLET: at 09:10

## 2022-10-18 RX ADMIN — DOCUSATE SODIUM 200 MG: 100 CAPSULE, LIQUID FILLED ORAL at 09:10

## 2022-10-18 RX ADMIN — IBUPROFEN 800 MG: 200 TABLET, FILM COATED ORAL at 09:10

## 2022-10-18 NOTE — DISCHARGE SUMMARY
Delivery Discharge Summary  Obstetrics      Primary OB Clinician: Sharron Clifton MD    Discharge Provider: Isabella Mina MD    Admission date: 10/15/2022  Discharge date: 10/18/2022    Admit Dx:   Discharge Dx:    Patient Active Problem List   Diagnosis    Previous  section     delivery delivered       Procedure: , due to previous LTCS and declines TOLAC    Hospital Course:  Mack Lopez is a 37 y.o. now  who was admitted on 10/15/2022 for delivery. Patient delivered a viable . Please see delivery note for further details. Pt was in stable condition post delivery and was transferred to the Mother-Baby Unit. Her postpartum course was uncomplicated. On the date of discharge, patient's pain is controlled with oral pain medications. She is tolerating ambulation without SOB or CP, and PO diet without N/V. Reported lochia is within the normal range. Pt in stable condition and ready for discharge.     Pertinent studies:  Postpartum CBC  Lab Results   Component Value Date    WBC 8.7 10/15/2022    HGB 13.4 10/15/2022    HCT 40.6 10/15/2022    MCV 95.3 (H) 10/15/2022     10/15/2022       Delivery:    Episiotomy:     Lacerations:     Repair suture:     Repair # of packets:     Blood loss (ml):       Birth information:  YOB: 2021   Time of birth: 4:39 AM   Sex: male   Delivery type: , Low Transverse   Gestational Age: 38w1d    Delivery Clinician:      Other providers:       Additional  information:  Forceps:    Vacuum:    Breech:    Observed anomalies      Living?:           APGARS  One minute Five minutes Ten minutes   Skin color:         Heart rate:         Grimace:         Muscle tone:         Breathing:         Totals: 8  9        Placenta: Delivered:       appearance    Disposition: To home, self care    Follow Up: 2 weeks    Patient Instructions:   1. Call the office for any bleeding >2 pads/hour for >2 hours, temperature >100.4, pain that is  uncontrolled with medications, or for any other concerns.  2. Pelvic rest and no tub baths x 6 weeks.  3. No driving while on narcotics.    Current Discharge Medication List        START taking these medications    Details   ibuprofen (ADVIL,MOTRIN) 800 MG tablet Take 1 tablet (800 mg total) by mouth every 8 (eight) hours.  Qty: 30 tablet, Refills: 0           CONTINUE these medications which have NOT CHANGED    Details   prenatal vit/iron fum/folic ac (PRENATAL 1+1 ORAL) Take by mouth.             Isabella Mina

## 2022-10-18 NOTE — PLAN OF CARE
Problem: Breastfeeding  Goal: Effective Breastfeeding  Outcome: Met  Intervention: Promote Breast Care and Comfort  Flowsheets (Taken 10/18/2022 0930)  Breast Care: Breastfeeding:   Hydrogel dressing applied   warm shower encouraged  Intervention: Promote Effective Breastfeeding  Flowsheets (Taken 10/18/2022 0930)  Breastfeeding Assistance:   feeding cue recognition promoted   feeding on demand promoted   feeding session observed   support offered   infant suck/swallow verified   infant latch-on verified  Parent/Child Attachment Promotion:   cue recognition promoted   skin-to-skin contact encouraged  Intervention: Support Exclusive Breastfeeding Success  Flowsheets (Taken 10/18/2022 0930)  Breastfeeding Support: lactation counseling provided

## 2022-10-18 NOTE — PLAN OF CARE
"  Problem: Adult Inpatient Plan of Care  Goal: Plan of Care Review  Outcome: Ongoing, Progressing  Goal: Patient-Specific Goal (Individualized)  Outcome: Ongoing, Progressing  Flowsheets (Taken 10/17/2022 1918)  Patient-Specific Goals (Include Timeframe): " I want to have a healthy baby"  Goal: Absence of Hospital-Acquired Illness or Injury  Outcome: Ongoing, Progressing  Goal: Optimal Comfort and Wellbeing  Outcome: Ongoing, Progressing  Goal: Readiness for Transition of Care  Outcome: Ongoing, Progressing     Problem:  Fall Injury Risk  Goal: Absence of Fall, Infant Drop and Related Injury  Outcome: Ongoing, Progressing     Problem: Infection  Goal: Absence of Infection Signs and Symptoms  Outcome: Ongoing, Progressing     Problem: Breastfeeding  Goal: Effective Breastfeeding  Outcome: Ongoing, Progressing     "

## 2022-10-19 ENCOUNTER — PATIENT OUTREACH (OUTPATIENT)
Dept: ADMINISTRATIVE | Facility: CLINIC | Age: 37
End: 2022-10-19
Payer: MEDICAID

## 2022-10-19 NOTE — PROGRESS NOTES
C3 nurse spoke with Mack Lopez  for a TCC post hospital discharge follow up call. The patient has a scheduled [facility] appointment with Dr. Alonso on 11/03/2022.  Patient stated she took Rolaid 2 tablets today for heartburn. Stated she has Tums available and unsure which medication is better for her to take. Advised her to discuss with pediatrician tomorrow or call Dr. Alonso regarding which medication to take while breastfeeding; verbalized understanding. Patient provided with the phone number for the [facility] to check the safety of a medication.

## 2022-10-20 LAB
DHEA SERPL-MCNC: NORMAL
ESTROGEN SERPL-MCNC: NORMAL PG/ML
INSULIN SERPL-ACNC: NORMAL U[IU]/ML
LAB AP CLINICAL INFORMATION: NORMAL
LAB AP GROSS DESCRIPTION: NORMAL
LAB AP REPORT FOOTNOTES: NORMAL
T3RU NFR SERPL: NORMAL %

## (undated) DEVICE — PAD SANITARY OB STERILE

## (undated) DEVICE — BULB SYRINGE EAR IRRIGATION

## (undated) DEVICE — SUT 2/0 27IN PLAIN GUT CT

## (undated) DEVICE — SUT 0 36IN COATED VICRYL VI

## (undated) DEVICE — SUT GUT 2-0 54

## (undated) DEVICE — SUT VICRYL ANTIMICRO VIL BR

## (undated) DEVICE — SOL WATER STRL IRR 1000ML

## (undated) DEVICE — SOL NACL IRR 1000ML BTL

## (undated) DEVICE — PAD ABDOMINAL 5X9 STERILE

## (undated) DEVICE — TAPE ADH MEDIPORE 4 X 10YDS

## (undated) DEVICE — DRESSING TEGADERM CHG 4X4.5

## (undated) DEVICE — BINDER ABDOM 4PANEL 12IN LG/XL

## (undated) DEVICE — SUT 2-0 VICRYL / CT-1

## (undated) DEVICE — SUT MONOCRYL 4-0 PS-1 UND

## (undated) DEVICE — SUT MONOCRYL 3-0 KS UND MON

## (undated) DEVICE — SEE MEDLINE ITEM 157117

## (undated) DEVICE — PAD UNDERPAD 30X30

## (undated) DEVICE — ELECTRODE REM PLYHSV RETURN 9

## (undated) DEVICE — CAP BABY BEANIE

## (undated) DEVICE — Device

## (undated) DEVICE — SUT VICRYL PLUS 1 CTX 36IN

## (undated) DEVICE — SEE MEDLINE ITEM 156931

## (undated) DEVICE — GAUZE 4 IN COVER ROLL STRETCH

## (undated) DEVICE — SUT VICRYL PLUS 0 CTX 36IN